# Patient Record
Sex: FEMALE | NOT HISPANIC OR LATINO | URBAN - METROPOLITAN AREA
[De-identification: names, ages, dates, MRNs, and addresses within clinical notes are randomized per-mention and may not be internally consistent; named-entity substitution may affect disease eponyms.]

---

## 2017-02-08 ENCOUNTER — CHARTING TRANS (OUTPATIENT)
Dept: OBGYN | Age: 22
End: 2017-02-08

## 2017-02-08 ENCOUNTER — LAB SERVICES (OUTPATIENT)
Dept: OTHER | Age: 22
End: 2017-02-08

## 2017-02-08 ENCOUNTER — MYAURORA ACCOUNT LINK (OUTPATIENT)
Dept: OTHER | Age: 22
End: 2017-02-08

## 2017-02-08 LAB
25(OH)D3 SERPL-MCNC: ABNORMAL NG/ML
CLUE CELLS: PRESENT
TRICHOMONAS ANTIGEN: ABNORMAL
TRICHOMONAS: ABNORMAL

## 2017-03-08 ENCOUNTER — LAB SERVICES (OUTPATIENT)
Dept: OTHER | Age: 22
End: 2017-03-08

## 2017-03-08 ENCOUNTER — CHARTING TRANS (OUTPATIENT)
Dept: OBGYN | Age: 22
End: 2017-03-08

## 2017-03-08 LAB
25(OH)D3 SERPL-MCNC: ABNORMAL NG/ML
CLUE CELLS: PRESENT
TRICHOMONAS ANTIGEN: NEGATIVE
TRICHOMONAS: ABNORMAL

## 2017-04-05 ENCOUNTER — LAB SERVICES (OUTPATIENT)
Dept: OTHER | Age: 22
End: 2017-04-05

## 2017-04-05 ENCOUNTER — CHARTING TRANS (OUTPATIENT)
Dept: OBGYN | Age: 22
End: 2017-04-05

## 2017-04-05 LAB
25(OH)D3 SERPL-MCNC: NORMAL NG/ML
CLUE CELLS: NORMAL
TRICHOMONAS ANTIGEN: NORMAL
TRICHOMONAS: NORMAL

## 2017-05-31 ENCOUNTER — MYAURORA ACCOUNT LINK (OUTPATIENT)
Dept: OTHER | Age: 22
End: 2017-05-31

## 2017-05-31 ENCOUNTER — LAB SERVICES (OUTPATIENT)
Dept: OTHER | Age: 22
End: 2017-05-31

## 2017-05-31 ENCOUNTER — CHARTING TRANS (OUTPATIENT)
Dept: OBGYN | Age: 22
End: 2017-05-31

## 2017-05-31 LAB
25(OH)D3 SERPL-MCNC: NORMAL NG/ML
BILIRUBIN URINE: NEGATIVE
BLOOD URINE: NEGATIVE
CLARITY: ABNORMAL
CLUE CELLS: NORMAL
COLOR: YELLOW
GLUCOSE QUALITATIVE U: 50
KETONES, URINE: NEGATIVE
LEUKOCYTE ESTERASE URINE: NEGATIVE
NITRITE URINE: NEGATIVE
PH URINE: 6 (ref 5–7)
SPECIFIC GRAVITY URINE: 1.03 (ref 1–1.03)
TRICHOMONAS ANTIGEN: NORMAL
TRICHOMONAS: NORMAL
URINE PROTEIN, QUAL (DIPSTICK): NEGATIVE
UROBILINOGEN URINE: <2

## 2017-08-15 ENCOUNTER — CHARTING TRANS (OUTPATIENT)
Dept: OBGYN | Age: 22
End: 2017-08-15

## 2017-10-02 ENCOUNTER — CHARTING TRANS (OUTPATIENT)
Dept: OTHER | Age: 22
End: 2017-10-02

## 2017-10-02 ENCOUNTER — LAB SERVICES (OUTPATIENT)
Dept: OTHER | Age: 22
End: 2017-10-02

## 2017-10-02 LAB — RAPID STREP GROUP A: NORMAL

## 2017-10-17 ENCOUNTER — CHARTING TRANS (OUTPATIENT)
Dept: OTHER | Age: 22
End: 2017-10-17

## 2017-10-23 ENCOUNTER — MYAURORA ACCOUNT LINK (OUTPATIENT)
Dept: OTHER | Age: 22
End: 2017-10-23

## 2017-10-23 ENCOUNTER — LAB SERVICES (OUTPATIENT)
Dept: OTHER | Age: 22
End: 2017-10-23

## 2017-10-23 ENCOUNTER — CHARTING TRANS (OUTPATIENT)
Dept: OBGYN | Age: 22
End: 2017-10-23

## 2017-10-23 LAB
HBV SURFACE AG SERPL QL IA: NEGATIVE
HIV1+2 AB SERPL QL IA: NEGATIVE

## 2017-10-24 LAB — HCV AB SER QL: NEGATIVE

## 2017-10-25 LAB — RPR SER QL: NORMAL

## 2017-11-07 ENCOUNTER — CHARTING TRANS (OUTPATIENT)
Dept: OTHER | Age: 22
End: 2017-11-07

## 2018-09-06 ENCOUNTER — CHARTING TRANS (OUTPATIENT)
Dept: OTHER | Age: 23
End: 2018-09-06

## 2018-11-02 VITALS
HEART RATE: 80 BPM | WEIGHT: 210.21 LBS | TEMPERATURE: 97.7 F | BODY MASS INDEX: 38.68 KG/M2 | SYSTOLIC BLOOD PRESSURE: 118 MMHG | RESPIRATION RATE: 18 BRPM | OXYGEN SATURATION: 98 % | HEIGHT: 62 IN | DIASTOLIC BLOOD PRESSURE: 78 MMHG

## 2018-11-02 VITALS
HEART RATE: 83 BPM | TEMPERATURE: 97.9 F | RESPIRATION RATE: 18 BRPM | OXYGEN SATURATION: 97 % | DIASTOLIC BLOOD PRESSURE: 70 MMHG | SYSTOLIC BLOOD PRESSURE: 108 MMHG | HEIGHT: 62 IN | WEIGHT: 205.69 LBS | BODY MASS INDEX: 37.85 KG/M2

## 2018-11-28 VITALS
SYSTOLIC BLOOD PRESSURE: 104 MMHG | WEIGHT: 190 LBS | DIASTOLIC BLOOD PRESSURE: 70 MMHG | HEIGHT: 62 IN | BODY MASS INDEX: 34.96 KG/M2

## 2018-11-28 VITALS
WEIGHT: 212 LBS | HEIGHT: 62 IN | DIASTOLIC BLOOD PRESSURE: 68 MMHG | BODY MASS INDEX: 39.01 KG/M2 | SYSTOLIC BLOOD PRESSURE: 112 MMHG

## 2018-11-28 VITALS
HEIGHT: 62 IN | BODY MASS INDEX: 39.75 KG/M2 | DIASTOLIC BLOOD PRESSURE: 60 MMHG | WEIGHT: 216 LBS | SYSTOLIC BLOOD PRESSURE: 114 MMHG

## 2018-11-28 VITALS
SYSTOLIC BLOOD PRESSURE: 118 MMHG | BODY MASS INDEX: 39.38 KG/M2 | WEIGHT: 214 LBS | DIASTOLIC BLOOD PRESSURE: 70 MMHG | HEIGHT: 62 IN

## 2018-11-28 VITALS
HEIGHT: 62 IN | SYSTOLIC BLOOD PRESSURE: 130 MMHG | BODY MASS INDEX: 38.28 KG/M2 | WEIGHT: 208 LBS | DIASTOLIC BLOOD PRESSURE: 70 MMHG

## 2018-11-29 VITALS
DIASTOLIC BLOOD PRESSURE: 86 MMHG | BODY MASS INDEX: 38.46 KG/M2 | HEIGHT: 62 IN | WEIGHT: 209 LBS | SYSTOLIC BLOOD PRESSURE: 124 MMHG

## 2019-07-16 ENCOUNTER — TELEPHONE (OUTPATIENT)
Dept: OBGYN | Age: 24
End: 2019-07-16

## 2020-10-03 PROBLEM — N76.0 BACTERIAL VAGINOSIS: Status: ACTIVE | Noted: 2020-07-20

## 2020-10-03 PROBLEM — E66.01 OBESITY, MORBID (HCC): Status: ACTIVE | Noted: 2020-10-03

## 2020-10-03 PROBLEM — F33.1 MODERATE EPISODE OF RECURRENT MAJOR DEPRESSIVE DISORDER (HCC): Status: ACTIVE | Noted: 2020-10-03

## 2020-10-03 PROBLEM — B96.89 BACTERIAL VAGINOSIS: Status: ACTIVE | Noted: 2020-07-20

## 2021-03-23 ENCOUNTER — TELEMEDICINE (OUTPATIENT)
Dept: TELEHEALTH | Age: 26
End: 2021-03-23

## 2021-03-23 DIAGNOSIS — Z02.9 ADMINISTRATIVE ENCOUNTER: Primary | ICD-10-CM

## 2021-03-23 NOTE — PROGRESS NOTES
Patient presents via [employer]. She is at her workplace and found a private spot to conduct her Video Visit. Pt states that she awoke with upper mid back pain. Pt took Ibuprofen and it improved.   Patient noticed mid low back pain while at wo

## 2021-03-29 ENCOUNTER — TELEMEDICINE (OUTPATIENT)
Dept: TELEHEALTH | Age: 26
End: 2021-03-29

## 2021-03-29 DIAGNOSIS — Z02.9 ADMINISTRATIVE ENCOUNTER: ICD-10-CM

## 2021-03-29 DIAGNOSIS — M54.9 ACUTE BACK PAIN, UNSPECIFIED BACK LOCATION, UNSPECIFIED BACK PAIN LATERALITY: Primary | ICD-10-CM

## 2021-03-29 PROCEDURE — 99202 OFFICE O/P NEW SF 15 MIN: CPT | Performed by: NURSE PRACTITIONER

## 2021-03-29 NOTE — PROGRESS NOTES
This is a telemedicine visit with live, interactive video and audio. Patient understands and accepts financial responsibility for any deductible, co-insurance and/or co-pays associated with this service.     SUBJECTIVE  \"I would like a return to work n bladder control, urinary symptoms,  persistent pain, or sudden weakness  · Avoid jerky movements when lifting  · Lift with the legs by straddling the load; bend the knees to  the load; keep straight (do not bend back).   · Keep objects close to the b

## 2021-03-29 NOTE — PATIENT INSTRUCTIONS
· Use NSAID as directed  · Muscle relaxer up to 3 times daily if needed  · Apply heat to the area 3 times per day  · Follow up with primary doctor if no improvement in 10 days, sooner if symptoms worsen  · Go to ER for loss of bowel or bladder control, uri

## 2021-04-07 PROBLEM — M54.50 ACUTE MIDLINE LOW BACK PAIN WITHOUT SCIATICA: Status: ACTIVE | Noted: 2021-04-07

## 2021-05-13 PROBLEM — M54.6 ACUTE BILATERAL THORACIC BACK PAIN: Status: ACTIVE | Noted: 2021-05-13

## 2021-05-13 PROBLEM — M54.50 ACUTE BILATERAL LOW BACK PAIN WITHOUT SCIATICA: Status: ACTIVE | Noted: 2021-05-13

## 2025-08-01 ENCOUNTER — V-VISIT (OUTPATIENT)
Dept: FAMILY MEDICINE | Age: 30
End: 2025-08-01

## 2025-08-01 DIAGNOSIS — A60.04 HERPES SIMPLEX VULVOVAGINITIS: Primary | ICD-10-CM

## 2025-08-01 RX ORDER — ACYCLOVIR 400 MG/1
400 TABLET ORAL 3 TIMES DAILY
Qty: 15 TABLET | Refills: 0 | Status: SHIPPED | OUTPATIENT
Start: 2025-08-01 | End: 2025-08-06

## 2025-08-01 RX ORDER — ACYCLOVIR 200 MG/1
CAPSULE ORAL
COMMUNITY
Start: 2012-11-19

## 2025-08-01 RX ORDER — LAMOTRIGINE 50 MG/1
50 TABLET, EXTENDED RELEASE ORAL EVERY MORNING
COMMUNITY
Start: 2025-07-15

## 2025-08-01 RX ORDER — HYDROXYZINE HYDROCHLORIDE 25 MG/1
25 TABLET, FILM COATED ORAL EVERY 6 HOURS PRN
COMMUNITY
Start: 2025-07-15

## 2025-09-06 ENCOUNTER — V-VISIT (OUTPATIENT)
Dept: FAMILY MEDICINE | Age: 30
End: 2025-09-06

## 2025-09-06 DIAGNOSIS — N76.0 BACTERIAL VAGINOSIS: Primary | ICD-10-CM

## 2025-09-06 DIAGNOSIS — B96.89 BACTERIAL VAGINOSIS: Primary | ICD-10-CM

## 2025-09-06 RX ORDER — METRONIDAZOLE 500 MG/1
500 TABLET ORAL 2 TIMES DAILY
Qty: 14 TABLET | Refills: 0 | Status: SHIPPED | OUTPATIENT
Start: 2025-09-06 | End: 2025-09-13

## (undated) NOTE — LETTER
Date: 3/29/2021    Patient Name: Misty Tavarez          To Whom it may concern: This letter has been written at the patient's request. The above patient was seen at the [facility] for treatment of a medical condition.     This patient s